# Patient Record
Sex: FEMALE | Employment: FULL TIME | URBAN - METROPOLITAN AREA
[De-identification: names, ages, dates, MRNs, and addresses within clinical notes are randomized per-mention and may not be internally consistent; named-entity substitution may affect disease eponyms.]

---

## 2017-01-26 ENCOUNTER — OFFICE VISIT (OUTPATIENT)
Dept: ORTHOPEDIC SURGERY | Age: 57
End: 2017-01-26

## 2017-01-26 VITALS
DIASTOLIC BLOOD PRESSURE: 64 MMHG | HEIGHT: 63 IN | TEMPERATURE: 98.2 F | OXYGEN SATURATION: 97 % | BODY MASS INDEX: 24.8 KG/M2 | WEIGHT: 140 LBS | HEART RATE: 62 BPM | RESPIRATION RATE: 18 BRPM | SYSTOLIC BLOOD PRESSURE: 117 MMHG

## 2017-01-26 DIAGNOSIS — M54.17 RADICULOPATHY, LUMBOSACRAL REGION: ICD-10-CM

## 2017-01-26 DIAGNOSIS — M47.816 SPONDYLOSIS WITHOUT MYELOPATHY OR RADICULOPATHY, LUMBAR REGION: Primary | ICD-10-CM

## 2017-01-26 RX ORDER — TIAGABINE HYDROCHLORIDE 4 MG/1
4 TABLET, FILM COATED ORAL
Qty: 270 TAB | Refills: 1 | Status: SHIPPED | OUTPATIENT
Start: 2017-01-26 | End: 2017-07-20 | Stop reason: SDUPTHER

## 2017-01-26 RX ORDER — ESOMEPRAZOLE MAGNESIUM 40 MG/1
CAPSULE, DELAYED RELEASE ORAL DAILY
COMMUNITY

## 2017-01-26 RX ORDER — LANOLIN ALCOHOL/MO/W.PET/CERES
325 CREAM (GRAM) TOPICAL
COMMUNITY

## 2017-01-26 RX ORDER — ALBUTEROL SULFATE 90 UG/1
2 AEROSOL, METERED RESPIRATORY (INHALATION)
COMMUNITY
Start: 2014-05-29

## 2017-01-26 NOTE — PROGRESS NOTES
[facility]  [address], [address]  Phone: [phone]  Fax: [phone]        PROGRESS NOTE      HISTORY OF PRESENT ILLNESS:  The patient is a 49 y.o. female and was seen today for follow up of low back pain extending into the bilateral lower extremities with left lower extremity symptoms greater than right. Pain extended in an SI distribution to the foot. In the past, it was noted she had minimal relief with lumbar blocks and physical therapy. She continues with Gabitril 4 mg TID and has decreased her Ibuprofen 800 mg intake to BID. She does HEP twice daily. She had negative lower extremity EMGs. Lumbar spine MRI per report revealed severe facet arthropathy at L5-S1 with bilateral facet joint effusions and moderate foraminal stenosis. There was no significant central canal stenosis. There was severe facet arthropathy at L5-S1 with slight anterolisthesis secondary to facet arthropathy. There was moderate foraminal stenosis at this level. At her last clinic appointment, patient wished to continue her current treatment. I refilled Gabitril and she decreased taking Motrin 800 mg to as needed. I recommended she continue the frequency of HEP daily. The patient returns today with pain location and distribution remain unchanged. She rates pain 3/10, a slight increase from her last visit (1/10). Patient admits completing her HEP every other day. She is compliant with Gabitril 4 mg TID. Patient reports she has not taken Motrin since 10/30/16.  reviewed. Past Medical History   Diagnosis Date    Depression     Facet arthropathy, lumbar (HCC)      L4-L5 and L3-L4    Hypercholesterolemia     Hypertension     Migraine headache     Vitamin D deficiency         Social History     Social History    Marital status: SINGLE     Spouse name: N/A    Number of children: N/A    Years of education: N/A     Occupational History    Not on file.      Social History Main Topics    Smoking status: Current Every Day Smoker     Packs/day: 0.25    Smokeless tobacco: Never Used    Alcohol use No    Drug use: Not on file    Sexual activity: Not on file      Comment: hysterectomy     Other Topics Concern    Not on file     Social History Narrative       Current Outpatient Prescriptions   Medication Sig Dispense Refill    esomeprazole (NEXIUM) 40 mg capsule Take  by mouth daily.  tiaGABine (GABITRIL) 4 mg tablet Take 1 Tab by mouth three (3) times daily (with meals). 270 Tab 1    tiaGABine (GABITRIL) 4 mg tablet Take 1 Tab by mouth three (3) times daily (with meals). 270 Tab 1    butalbital-acetaminophen-caffeine (FIORICET, ESGIC) -40 mg per tablet Take 1 Tab by Mouth Every 4 Hours As Needed.  atorvastatin (LIPITOR) 20 mg tablet Take  by mouth daily.  lisinopril-hydrochlorothiazide (PRINZIDE) 10-12.5 mg per tablet Take  by mouth daily.  ALPRAZolam (XANAX) 0.5 mg tablet Take 0.5 mg by mouth nightly as needed.  cholecalciferol, vitamin D3, (VITAMIN D3) 2,000 unit tab Take  by mouth daily.  escitalopram (LEXAPRO) 10 mg tablet Take 10 mg by mouth daily.  estradiol (ESTRACE) 1 mg tablet Take 1 mg by mouth daily.  ferrous sulfate 325 mg (65 mg iron) tablet 325 mg.      albuterol (PROVENTIL HFA, VENTOLIN HFA, PROAIR HFA) 90 mcg/actuation inhaler 2 Puffs.  tiaGABine (GABITRIL) 4 mg tablet Take 1 Tab by mouth three (3) times daily (with meals). 270 Tab 1    ibuprofen (MOTRIN) 800 mg tablet Take 1 Tab by mouth three (3) times daily (with meals). 270 Tab 0    tiaGABine (GABITRIL) 4 mg tablet 1 tab PO TID 90 Tab 1    ibuprofen (MOTRIN) 800 mg tablet Take 1 tablet by mouth three (3) times daily as needed for Pain. 90 tablet 1    lansoprazole (PREVACID) 30 mg capsule Take 30 mg by mouth Daily (before breakfast).          Allergies   Allergen Reactions    Voltaren [Diclofenac Sodium] Itching and Swelling          PHYSICAL EXAMINATION    Visit Vitals    /64 (BP 1 Location: Left arm, BP Patient Position: Sitting)    Pulse 62    Temp 98.2 °F (36.8 °C) (Oral)    Resp 18    Ht 5' 3\" (1.6 m)    Wt 140 lb (63.5 kg)    SpO2 97%    BMI 24.8 kg/m2       CONSTITUTIONAL: NAD, A&O x 3  SENSATION: Intact to light touch throughout  RANGE OF MOTION: The patient has full passive range of motion in all four extremities. MOTOR:  Straight Leg Raise: Negative, bilateral               Hip Flex Knee Ext Knee Flex Ankle DF GTE Ankle PF Tone   Right +4/5 +4/5 +4/5 +4/5 +4/5 +4/5 +4/5   Left +4/5 +4/5 +4/5 +4/5 +4/5 +4/5 +4/5       ASSESSMENT   Ankush Garcia was seen today for back pain and leg pain. Diagnoses and all orders for this visit:    Spondylosis without myelopathy or radiculopathy, lumbar region    Radiculopathy, lumbosacral region    Other orders  -     tiaGABine (GABITRIL) 4 mg tablet; Take 1 Tab by mouth three (3) times daily (with meals). IMPRESSION AND PLAN:  Patient has been holding steady. She would like to continue with her current regimen. I will refill her Gabitril 4 mg TID. Patient will attempt to decrease her Gabitril 4 mg to BID but has been instructed to increase back up to TID if pain elevates. I recommend she increase the frequency of HEP to daily. I will see the patient back in 6 month's time or earlier if needed. Written by Mira Arrieta, as dictated by Shamar Garnica MD  I examined the patient, reviewed and agree with the note.

## 2017-07-20 ENCOUNTER — OFFICE VISIT (OUTPATIENT)
Dept: ORTHOPEDIC SURGERY | Age: 57
End: 2017-07-20

## 2017-07-20 VITALS
HEART RATE: 62 BPM | SYSTOLIC BLOOD PRESSURE: 119 MMHG | WEIGHT: 137.8 LBS | RESPIRATION RATE: 18 BRPM | HEIGHT: 63 IN | DIASTOLIC BLOOD PRESSURE: 57 MMHG | BODY MASS INDEX: 24.41 KG/M2

## 2017-07-20 DIAGNOSIS — M54.17 RADICULOPATHY, LUMBOSACRAL REGION: ICD-10-CM

## 2017-07-20 DIAGNOSIS — M47.816 SPONDYLOSIS WITHOUT MYELOPATHY OR RADICULOPATHY, LUMBAR REGION: Primary | ICD-10-CM

## 2017-07-20 RX ORDER — OMEPRAZOLE 20 MG/1
20 TABLET, DELAYED RELEASE ORAL DAILY
COMMUNITY

## 2017-07-20 RX ORDER — COLESEVELAM 180 1/1
1875 TABLET ORAL 2 TIMES DAILY WITH MEALS
COMMUNITY

## 2017-07-20 RX ORDER — BUPROPION HYDROCHLORIDE 150 MG/1
150 TABLET, EXTENDED RELEASE ORAL
COMMUNITY
Start: 2017-07-13

## 2017-07-20 RX ORDER — BUTALBITAL, ASPIRIN, AND CAFFEINE 325; 50; 40 MG/1; MG/1; MG/1
CAPSULE ORAL
COMMUNITY
End: 2018-05-03 | Stop reason: SDUPTHER

## 2017-07-20 NOTE — MR AVS SNAPSHOT
Visit Information Date & Time Provider Department Dept. Phone Encounter #  
 7/20/2017  9:10 AM Rad Sanchez MD [facility] [phone] [id number] Follow-up Instructions Return in about 6 months (around 1/20/2018). Upcoming Health Maintenance Date Due Hepatitis C Screening 1960 Pneumococcal 19-64 Medium Risk (1 of 1 - PPSV23) 7/29/1981 DTaP/Tdap/Td series (1 - Tdap) 7/29/1983 PAP AKA CERVICAL CYTOLOGY 7/29/1983 BREAST CANCER SCRN MAMMOGRAM 7/29/2012 FOBT Q 1 YEAR AGE 50-75 7/29/2012 INFLUENZA AGE 9 TO ADULT 8/1/2017 Allergies as of 7/20/2017  Review Complete On: 7/20/2017 By: Rad Sanchez MD  
  
 Severity Noted Reaction Type Reactions Voltaren [Diclofenac Sodium]  04/29/2014    Itching, Swelling Current Immunizations  Never Reviewed No immunizations on file. Not reviewed this visit You Were Diagnosed With   
  
 Codes Comments Spondylosis without myelopathy or radiculopathy, lumbar region    -  Primary ICD-10-CM: M47.816 ICD-9-CM: 721.3 Radiculopathy, lumbosacral region     ICD-10-CM: M54.17 ICD-9-CM: 724.4 Vitals BP Pulse Resp Height(growth percentile) Weight(growth percentile) BMI  
 119/57 62 18 5' 3\" (1.6 m) 137 lb 12.8 oz (62.5 kg) 24.41 kg/m2 OB Status Smoking Status Hysterectomy Current Every Day Smoker BMI and BSA Data Body Mass Index Body Surface Area  
 24.41 kg/m 2 1.67 m 2 Preferred Pharmacy Pharmacy Name Phone 305 [facility], [address] [place] [address] [address] Your Updated Medication List  
  
   
This list is accurate as of: 7/20/17 10:30 AM.  Always use your most recent med list.  
  
  
  
  
 albuterol 90 mcg/actuation inhaler Commonly known as:  PROVENTIL HFA, VENTOLIN HFA, PROAIR HFA  
2 Puffs. buPROPion  mg SR tablet Commonly known as:  Adebayo Costello  
 150 mg.  
  
 butalbital-acetaminophen-caffeine -40 mg per tablet Commonly known as:  Yvonne Congress Take 1 Tab by Mouth Every 4 Hours As Needed. butalbital-aspirin-caffeine capsule Commonly known as:  Reino Josias Take 1 Tab by Mouth Every 4 Hours As Needed. estradiol 1 mg tablet Commonly known as:  ESTRACE Take 1 mg by mouth daily. ferrous sulfate 325 mg (65 mg iron) tablet 325 mg. NOT TAKING DUE TO CAUSING STOMACH ISSUES  
  
 ibuprofen 800 mg tablet Commonly known as:  MOTRIN Take 1 tablet by mouth three (3) times daily as needed for Pain. LEXAPRO 10 mg tablet Generic drug:  escitalopram oxalate Take 10 mg by mouth daily. LIPITOR 20 mg tablet Generic drug:  atorvastatin Take  by mouth daily. NexIUM 40 mg capsule Generic drug:  esomeprazole Take  by mouth daily. NOT TAKING DUE TO CURRENTLY TAKING PRILOSEC  
  
 PREVACID 30 mg capsule Generic drug:  lansoprazole Take 30 mg by mouth Daily (before breakfast). NOT TAKING PriLOSEC OTC 20 mg tablet Generic drug:  omeprazole Take 20 mg by mouth daily. PRINZIDE 10-12.5 mg per tablet Generic drug:  lisinopril-hydroCHLOROthiazide Take  by mouth daily. tiaGABine 4 mg tablet Commonly known as:  GABITRIL Take 1 Tab by mouth three (3) times daily (with meals). VITAMIN D3 2,000 unit Tab Generic drug:  cholecalciferol (vitamin D3) Take  by mouth daily. WELCHOL 625 mg tablet Generic drug:  colesevelam  
Take 1,875 mg by mouth two (2) times daily (with meals). XANAX 0.5 mg tablet Generic drug:  ALPRAZolam  
Take 0.5 mg by mouth nightly as needed. Follow-up Instructions Return in about 6 months (around 1/20/2018). Introducing [facility]! Abena Zavala introduces [employer] patient portal. Now you can access parts of your medical record, email your doctor's office, and request medication refills online. 1. In your internet browser, go to [email or web address] 2. Click on the First Time User? Click Here link in the Sign In box. You will see the New Member Sign Up page. 3. Enter your [employer] Access Code exactly as it appears below. You will not need to use this code after youve completed the sign-up process. If you do not sign up before the expiration date, you must request a new code. · [employer] Access Code: [email or web address] Expires: 10/18/2017  9:01 AM 
 
4. Enter the last four digits of your Social Security Number (xxxx) and Date of Birth (mm/dd/yyyy) as indicated and click Submit. You will be taken to the next sign-up page. 5. Create a Slinkyt ID. This will be your [employer] login ID and cannot be changed, so think of one that is secure and easy to remember. 6. Create a [employer] password. You can change your password at any time. 7. Enter your Password Reset Question and Answer. This can be used at a later time if you forget your password. 8. Enter your e-mail address. You will receive e-mail notification when new information is available in [address] 9. Click Sign Up. You can now view and download portions of your medical record. 10. Click the Download Summary menu link to download a portable copy of your medical information. If you have questions, please visit the Frequently Asked Questions section of the [employer] website. Remember, [employer] is NOT to be used for urgent needs. For medical emergencies, dial 911. Now available from your iPhone and Android! Please provide this summary of care documentation to your next provider. Your primary care clinician is listed as Asa Colon. If you have any questions after today's visit, please call [phone].

## 2017-07-20 NOTE — PROGRESS NOTES
[facility]  [address]  [address], [address]  Phone: [phone]  Fax: [phone]        PROGRESS NOTE      HISTORY OF PRESENT ILLNESS:  The patient is a 49 y.o. female and was seen today for follow up of low back pain extending into the BLE (L>R). Pain extended in an SI distribution to the foot. In the past, it was noted she had minimal relief with lumbar blocks and physical therapy. Pt has not recently taken Ibuprofen. She had negative lower extremity EMGs. Lumbar spine MRI from 9/6/13 per report revealed severe facet arthropathy at L5-S1 with bilateral facet joint effusions and moderate foraminal stenosis. There was no significant central canal stenosis. There was severe facet arthropathy at L5-S1 with slight anterolisthesis secondary to facet arthropathy. There was moderate foraminal stenosis at this level. At her last clinic appointment, patient had been holding steady. She wished to continue with her current regimen. I refilled her Gabitril 4 mg TID. Patient attempted to decrease her Gabitril 4 mg to BID but was instructed to increase back up to TID if pain elevated. I recommended she increase the frequency of HEP to daily. The patient returns today with pain location and distribution remain unchanged. She rates pain 0-2/10, an improvement since her last visit (3/10). Pt was able to reduce her Gabitril 4 mg to BID without an increase in pain.  reviewed. Past Medical History:   Diagnosis Date    Depression     Facet arthropathy, lumbar (HCC)     L4-L5 and L3-L4    Hypercholesterolemia     Hypertension     Migraine headache     Vitamin D deficiency         Social History     Social History    Marital status: SINGLE     Spouse name: N/A    Number of children: N/A    Years of education: N/A     Occupational History    Not on file.      Social History Main Topics    Smoking status: Current Every Day Smoker     Packs/day: 0.25    Smokeless tobacco: Never Used    Alcohol use No    Drug use: Not on file    Sexual activity: Not on file      Comment: hysterectomy     Other Topics Concern    Not on file     Social History Narrative       Current Outpatient Prescriptions   Medication Sig Dispense Refill    buPROPion SR (WELLBUTRIN SR) 150 mg SR tablet 150 mg.      omeprazole (PRILOSEC OTC) 20 mg tablet Take 20 mg by mouth daily.  colesevelam (WELCHOL) 625 mg tablet Take 1,875 mg by mouth two (2) times daily (with meals).  tiaGABine (GABITRIL) 4 mg tablet Take 1 Tab by mouth three (3) times daily (with meals). (Patient taking differently: Take 4 mg by mouth two (2) times daily (with meals). ) 270 Tab 1    butalbital-acetaminophen-caffeine (FIORICET, ESGIC) -40 mg per tablet Take 1 Tab by Mouth Every 4 Hours As Needed.  atorvastatin (LIPITOR) 20 mg tablet Take  by mouth daily.  lisinopril-hydrochlorothiazide (PRINZIDE) 10-12.5 mg per tablet Take  by mouth daily.  ALPRAZolam (XANAX) 0.5 mg tablet Take 0.5 mg by mouth nightly as needed.  cholecalciferol, vitamin D3, (VITAMIN D3) 2,000 unit tab Take  by mouth daily.  escitalopram (LEXAPRO) 10 mg tablet Take 10 mg by mouth daily.  butalbital-aspirin-caffeine (FIORINAL) capsule Take 1 Tab by Mouth Every 4 Hours As Needed.  ferrous sulfate 325 mg (65 mg iron) tablet 325 mg. NOT TAKING DUE TO CAUSING STOMACH ISSUES      albuterol (PROVENTIL HFA, VENTOLIN HFA, PROAIR HFA) 90 mcg/actuation inhaler 2 Puffs.  esomeprazole (NEXIUM) 40 mg capsule Take  by mouth daily. NOT TAKING DUE TO CURRENTLY TAKING PRILOSEC      ibuprofen (MOTRIN) 800 mg tablet Take 1 tablet by mouth three (3) times daily as needed for Pain. 90 tablet 1    lansoprazole (PREVACID) 30 mg capsule Take 30 mg by mouth Daily (before breakfast). NOT TAKING      estradiol (ESTRACE) 1 mg tablet Take 1 mg by mouth daily.          Allergies   Allergen Reactions    Voltaren [Diclofenac Sodium] Itching and Swelling          PHYSICAL EXAMINATION    Visit Vitals    /57    Pulse 62    Resp 18    Ht 5' 3\" (1.6 m)    Wt 137 lb 12.8 oz (62.5 kg)    BMI 24.41 kg/m2       CONSTITUTIONAL: NAD, A&O x 3  SENSATION: Intact to light touch throughout  RANGE OF MOTION: The patient has full passive range of motion in all four extremities. MOTOR:  Straight Leg Raise: Negative, bilateral               Hip Flex Knee Ext Knee Flex Ankle DF GTE Ankle PF Tone   Right +4/5 +4/5 +4/5 +4/5 +4/5 +4/5 +4/5   Left +4/5 +4/5 +4/5 +4/5 +4/5 +4/5 +4/5       ASSESSMENT   Sangeeta Glover was seen today for back pain and leg pain. Diagnoses and all orders for this visit:    Spondylosis without myelopathy or radiculopathy, lumbar region    Radiculopathy, lumbosacral region          IMPRESSION AND PLAN:  She continues to do well despite the reduction in her Gabitril dose. Patient has been instructed to decrease her Gabitril 4 mg to once daily if tolerated. I encourage patient to perform her HEP daily. I will see the patient back in 6 month's time or earlier if needed. Written by Angelina Arauz, as dictated by Donta Elder MD  I examined the patient, reviewed and agree with the note.

## 2018-02-22 ENCOUNTER — OFFICE VISIT (OUTPATIENT)
Dept: ORTHOPEDIC SURGERY | Age: 58
End: 2018-02-22

## 2018-02-22 VITALS
WEIGHT: 138 LBS | OXYGEN SATURATION: 95 % | DIASTOLIC BLOOD PRESSURE: 66 MMHG | TEMPERATURE: 98 F | BODY MASS INDEX: 24.45 KG/M2 | RESPIRATION RATE: 18 BRPM | HEART RATE: 62 BPM | HEIGHT: 63 IN | SYSTOLIC BLOOD PRESSURE: 118 MMHG

## 2018-02-22 DIAGNOSIS — M54.17 RADICULOPATHY, LUMBOSACRAL REGION: ICD-10-CM

## 2018-02-22 DIAGNOSIS — M47.816 SPONDYLOSIS WITHOUT MYELOPATHY OR RADICULOPATHY, LUMBAR REGION: Primary | ICD-10-CM

## 2018-02-22 RX ORDER — TOPIRAMATE 25 MG/1
TABLET ORAL
Qty: 90 TAB | Refills: 1 | Status: SHIPPED | OUTPATIENT
Start: 2018-02-22

## 2018-02-22 NOTE — MR AVS SNAPSHOT
[address] [place] [address] [address] [facility] [address] 
[phone] Patient: Karen Wei MRN: [id number] HBB:[id number] Visit Information Date & Time Provider Department Dept. Phone Encounter #  
 2/22/2018 10:40 AM Delores Benito MD [facility] [phone] [id number] Follow-up Instructions Return in about 4 weeks (around 3/22/2018). Follow-up and Disposition History Upcoming Health Maintenance Date Due Hepatitis C Screening 1960 Pneumococcal 19-64 Medium Risk (1 of 1 - PPSV23) 7/29/1981 DTaP/Tdap/Td series (1 - Tdap) 7/29/1983 PAP AKA CERVICAL CYTOLOGY 7/29/1983 BREAST CANCER SCRN MAMMOGRAM 7/29/2012 FOBT Q 1 YEAR AGE 50-75 7/29/2012 Influenza Age 5 to Adult 8/1/2017 Allergies as of 2/22/2018  Review Complete On: 2/22/2018 By: Delores Benito MD  
  
 Severity Noted Reaction Type Reactions Voltaren [Diclofenac Sodium]  04/29/2014    Itching, Swelling Current Immunizations  Never Reviewed No immunizations on file. Not reviewed this visit You Were Diagnosed With   
  
 Codes Comments Spondylosis without myelopathy or radiculopathy, lumbar region    -  Primary ICD-10-CM: M47.816 ICD-9-CM: 721.3 Radiculopathy, lumbosacral region     ICD-10-CM: M54.17 ICD-9-CM: 724.4 Vitals BP Pulse Temp Resp Height(growth percentile) Weight(growth percentile)  
 118/66 62 98 °F (36.7 °C) (Oral) 18 5' 3\" (1.6 m) 138 lb (62.6 kg) SpO2 BMI OB Status Smoking Status 95% 24.45 kg/m2 Hysterectomy Current Every Day Smoker Vitals History BMI and BSA Data Body Mass Index Body Surface Area  
 24.45 kg/m 2 1.67 m 2 Preferred Pharmacy Pharmacy Name Phone [facility], [address] [place] [address] [phone] Your Updated Medication List  
  
   
 This list is accurate as of 2/22/18 11:23 AM.  Always use your most recent med list.  
  
  
  
  
 albuterol 90 mcg/actuation inhaler Commonly known as:  PROVENTIL HFA, VENTOLIN HFA, PROAIR HFA  
2 Puffs. buPROPion  mg SR tablet Commonly known as:  WELLBUTRIN SR  
150 mg.  
  
 butalbital-acetaminophen-caffeine -40 mg per tablet Commonly known as:  Denver Sorrow Take 1 Tab by Mouth Every 4 Hours As Needed. butalbital-aspirin-caffeine capsule Commonly known as:  Ebb Savin Take 1 Tab by Mouth Every 4 Hours As Needed. estradiol 1 mg tablet Commonly known as:  ESTRACE Take 1 mg by mouth daily. ferrous sulfate 325 mg (65 mg iron) tablet 325 mg. NOT TAKING DUE TO CAUSING STOMACH ISSUES  
  
 ibuprofen 800 mg tablet Commonly known as:  MOTRIN Take 1 tablet by mouth three (3) times daily as needed for Pain. LEXAPRO 10 mg tablet Generic drug:  escitalopram oxalate Take 10 mg by mouth daily. LIPITOR 20 mg tablet Generic drug:  atorvastatin Take  by mouth daily. NexIUM 40 mg capsule Generic drug:  esomeprazole Take  by mouth daily. NOT TAKING DUE TO CURRENTLY TAKING PRILOSEC  
  
 PREVACID 30 mg capsule Generic drug:  lansoprazole Take 30 mg by mouth Daily (before breakfast). NOT TAKING PriLOSEC OTC 20 mg tablet Generic drug:  omeprazole Take 20 mg by mouth daily. PRINZIDE 10-12.5 mg per tablet Generic drug:  lisinopril-hydroCHLOROthiazide Take  by mouth daily. * tiaGABine 4 mg tablet Commonly known as:  GABITRIL Take 1 Tab by mouth three (3) times daily (with meals). * tiaGABine 4 mg tablet Commonly known as:  GABITRIL Take 1 Tab by mouth daily (with breakfast). topiramate 25 mg tablet Commonly known as:  TOPAMAX 3 tabs PO QHS  
  
 VITAMIN D3 2,000 unit Tab Generic drug:  cholecalciferol (vitamin D3) Take  by mouth daily. WELCHOL 625 mg tablet Generic drug:  colesevelam  
 Take 1,875 mg by mouth two (2) times daily (with meals). XANAX 0.5 mg tablet Generic drug:  ALPRAZolam  
Take 0.5 mg by mouth nightly as needed. * Notice: This list has 2 medication(s) that are the same as other medications prescribed for you. Read the directions carefully, and ask your doctor or other care provider to review them with you. Prescriptions Sent to Pharmacy Refills  
 topiramate (TOPAMAX) 25 mg tablet 1 Sig: 3 tabs PO QHS Class: Normal  
 Pharmacy: [facility], [address] [place] [address] #: [phone] Follow-up Instructions Return in about 4 weeks (around 3/22/2018). Introducing [facility]! Koko Shaw introduces [employer] patient portal. Now you can access parts of your medical record, email your doctor's office, and request medication refills online. 1. In your internet browser, go to [email or web address] 2. Click on the First Time User? Click Here link in the Sign In box. You will see the New Member Sign Up page. 3. Enter your [employer] Access Code exactly as it appears below. You will not need to use this code after youve completed the sign-up process. If you do not sign up before the expiration date, you must request a new code. · [employer] Access Code: [email or web address] Expires: 5/23/2018 11:23 AM 
 
4. Enter the last four digits of your Social Security Number (xxxx) and Date of Birth (mm/dd/yyyy) as indicated and click Submit. You will be taken to the next sign-up page. 5. Create a TearSolutionst ID. This will be your [employer] login ID and cannot be changed, so think of one that is secure and easy to remember. 6. Create a [employer] password. You can change your password at any time. 7. Enter your Password Reset Question and Answer. This can be used at a later time if you forget your password. 8. Enter your e-mail address.  You will receive e-mail notification when new information is available in [employer]. 9. Click Sign Up. You can now view and download portions of your medical record. 10. Click the Download Summary menu link to download a portable copy of your medical information. If you have questions, please visit the Frequently Asked Questions section of the [employer] website. Remember, [employer] is NOT to be used for urgent needs. For medical emergencies, dial 911. Now available from your iPhone and Android! Please provide this summary of care documentation to your next provider. Your primary care clinician is listed as Venkata Meza. If you have any questions after today's visit, please call [phone].

## 2018-02-22 NOTE — PROGRESS NOTES
[facility] SPECIALISTS  [address] [facility]  Katerina Mata, [address]   Phone: [phone]  Fax: [phone]        PROGRESS NOTE      HISTORY OF PRESENT ILLNESS:  The patient is a 56 y.o. female and was seen today for follow up of low back pain extending into the BLE (L>R). Pain extended in an S1 distribution to the foot. In the past, it was noted she had minimal relief with lumbar blocks and physical therapy. Pt has not recently taken Ibuprofen. She had negative lower extremity EMGs. Lumbar spine MRI from 9/6/13 per report revealed severe facet arthropathy at L5-S1 with bilateral facet joint effusions and moderate foraminal stenosis. There was no significant central canal stenosis. There was severe facet arthropathy at L5-S1 with slight anterolisthesis secondary to facet arthropathy. There was moderate foraminal stenosis at this level. At her last clinic appointment, she continued to do well despite the reduction in her Gabitril dose. Patient has been instructed to decrease her Gabitril 4 mg to once daily if tolerated. I encouraged patient to perform her HEP daily. The patient returns today with pain location and distribution remain unchanged. Her pain is aggravated with sitting. She rates pain 2/10, consistent with her last visit (0-2/10). Pt attempted to decreased her Gabitril successfully. She has completely discontinued the medication since 11/2017 without a major increase in pain. She does report there are times where her pain increases due to increased activity, however it is tolerable. Pt has a diagnosis of post laminectomy syndrome with a hx of cervical spinal surgery. Pt denies h/o lumbar spinal surgery. Patient has taken Neurontin 600 mg TID in the past which was prescribed by Dr. Karen Musa. She is unable to recalled if she tolerated Neurontin. Patient cannot recall if she has taken Lyrica. Pt is not a candidate for CYMBALTA secondary to taking Lexapro for depression.  Patient denies history of glaucoma.  reviewed. Body mass index is 24.45 kg/(m^2). Past Medical History:   Diagnosis Date    Depression     Facet arthropathy, lumbar     L4-L5 and L3-L4    Hypercholesterolemia     Hypertension     Migraine headache     Vitamin D deficiency         Social History     Social History    Marital status: SINGLE     Spouse name: N/A    Number of children: N/A    Years of education: N/A     Occupational History    Not on file. Social History Main Topics    Smoking status: Current Every Day Smoker     Packs/day: 0.25    Smokeless tobacco: Never Used    Alcohol use No    Drug use: Not on file    Sexual activity: Not on file      Comment: hysterectomy     Other Topics Concern    Not on file     Social History Narrative       Current Outpatient Prescriptions   Medication Sig Dispense Refill    topiramate (TOPAMAX) 25 mg tablet 3 tabs PO QHS 90 Tab 1    omeprazole (PRILOSEC OTC) 20 mg tablet Take 20 mg by mouth daily.  albuterol (PROVENTIL HFA, VENTOLIN HFA, PROAIR HFA) 90 mcg/actuation inhaler 2 Puffs.  butalbital-acetaminophen-caffeine (FIORICET, ESGIC) -40 mg per tablet Take 1 Tab by Mouth Every 4 Hours As Needed.  atorvastatin (LIPITOR) 20 mg tablet Take  by mouth daily.  lisinopril-hydrochlorothiazide (PRINZIDE) 10-12.5 mg per tablet Take  by mouth daily.  ALPRAZolam (XANAX) 0.5 mg tablet Take 0.5 mg by mouth nightly as needed.  cholecalciferol, vitamin D3, (VITAMIN D3) 2,000 unit tab Take  by mouth daily.  escitalopram (LEXAPRO) 10 mg tablet Take 10 mg by mouth daily.  tiaGABine (GABITRIL) 4 mg tablet Take 1 Tab by mouth daily (with breakfast). 90 Tab 1    butalbital-aspirin-caffeine (FIORINAL) capsule Take 1 Tab by Mouth Every 4 Hours As Needed.  buPROPion SR (WELLBUTRIN SR) 150 mg SR tablet 150 mg.      colesevelam (WELCHOL) 625 mg tablet Take 1,875 mg by mouth two (2) times daily (with meals).       ferrous sulfate 325 mg (65 mg iron) tablet 325 mg. NOT TAKING DUE TO CAUSING STOMACH ISSUES      esomeprazole (NEXIUM) 40 mg capsule Take  by mouth daily. NOT TAKING DUE TO CURRENTLY TAKING PRILOSEC      tiaGABine (GABITRIL) 4 mg tablet Take 1 Tab by mouth three (3) times daily (with meals). (Patient taking differently: Take 4 mg by mouth two (2) times daily (with meals). ) 270 Tab 1    ibuprofen (MOTRIN) 800 mg tablet Take 1 tablet by mouth three (3) times daily as needed for Pain. 90 tablet 1    lansoprazole (PREVACID) 30 mg capsule Take 30 mg by mouth Daily (before breakfast). NOT TAKING      estradiol (ESTRACE) 1 mg tablet Take 1 mg by mouth daily. Allergies   Allergen Reactions    Voltaren [Diclofenac Sodium] Itching and Swelling          PHYSICAL EXAMINATION    Visit Vitals    /66    Pulse 62    Temp 98 °F (36.7 °C) (Oral)    Resp 18    Ht 5' 3\" (1.6 m)    Wt 138 lb (62.6 kg)    SpO2 95%    BMI 24.45 kg/m2       CONSTITUTIONAL: NAD, A&O x 3  SENSATION: Intact to light touch throughout  RANGE OF MOTION: The patient has full passive range of motion in all four extremities. MOTOR:  Straight Leg Raise: Negative, bilateral               Hip Flex Knee Ext Knee Flex Ankle DF GTE Ankle PF Tone   Right +4/5 +4/5 +4/5 +4/5 +4/5 +4/5 +4/5   Left +4/5 +4/5 +4/5 +4/5 +4/5 +4/5 +4/5       ASSESSMENT   Diagnoses and all orders for this visit:    1. Spondylosis without myelopathy or radiculopathy, lumbar region    2. Radiculopathy, lumbosacral region    Other orders  -     topiramate (TOPAMAX) 25 mg tablet; 3 tabs PO QHS          IMPRESSION AND PLAN:  Patient reported tolerable pain after completely discontinuing her Gabitril. Patient is neurologically intact. I will try her on Topamax 75 mg qhs. The risks, benefits, and potential side effects of this medication were discussed. Patient understands and wishes to proceed. Patient advised to call the office if intolerant to new medication.  I will see the patient back in 1 month's time or earlier if needed. Written by Lima Cerna, as dictated by Marsha Membreno MD  I examined the patient, reviewed and agree with the note.

## 2018-03-29 ENCOUNTER — TELEPHONE (OUTPATIENT)
Dept: ORTHOPEDIC SURGERY | Age: 58
End: 2018-03-29

## 2018-03-29 ENCOUNTER — OFFICE VISIT (OUTPATIENT)
Dept: ORTHOPEDIC SURGERY | Age: 58
End: 2018-03-29

## 2018-03-29 VITALS
RESPIRATION RATE: 14 BRPM | BODY MASS INDEX: 24.41 KG/M2 | HEIGHT: 63 IN | DIASTOLIC BLOOD PRESSURE: 71 MMHG | WEIGHT: 137.8 LBS | HEART RATE: 62 BPM | SYSTOLIC BLOOD PRESSURE: 119 MMHG

## 2018-03-29 DIAGNOSIS — M47.816 SPONDYLOSIS WITHOUT MYELOPATHY OR RADICULOPATHY, LUMBAR REGION: ICD-10-CM

## 2018-03-29 DIAGNOSIS — M54.17 RADICULOPATHY, LUMBOSACRAL REGION: ICD-10-CM

## 2018-03-29 DIAGNOSIS — M43.10 ACQUIRED SPONDYLOLISTHESIS: Primary | ICD-10-CM

## 2018-03-29 DIAGNOSIS — M54.17 RADICULOPATHY, LUMBOSACRAL REGION: Primary | ICD-10-CM

## 2018-03-29 RX ORDER — ROSUVASTATIN CALCIUM 10 MG/1
10 TABLET, COATED ORAL
COMMUNITY
Start: 2018-03-28

## 2018-03-29 RX ORDER — DULOXETIN HYDROCHLORIDE 30 MG/1
30 CAPSULE, DELAYED RELEASE ORAL DAILY
Qty: 30 CAP | Refills: 1 | Status: SHIPPED | OUTPATIENT
Start: 2018-03-29 | End: 2018-08-02 | Stop reason: SDUPTHER

## 2018-03-29 NOTE — TELEPHONE ENCOUNTER
Called Dr. Hever Beltran office [phone] and left voice mail asking per the provider if it would be okay to change the patient from taking Lexapro 10 mg to Cymbalta 30 mg 1 po q daily. Received a call back from [facility] from Dr. Yue Carrera office and she states they received my message but she was on the telephone with a patient at the time. She states the doctor said it was okay for the patient to switch from Lexapro 10 mg to Cymbalta 30 mg once a day. I inquired if there is a weaning process and [facility] stated she would have to check with the doctor. She put me on hold and then came back to the telephone and stated that per the doctor there is no weaning instructions. The patient can just switch. I have spoke to Franky Phelps in regards to this message and he gave a verbal for the Cymbalta 30 mg #30 take 1 po q daily with 1 refill. Called patient and left voice mail informing patient that we have received the approval from her PCP to switch her from the Lexapro 10 mg to the Cymbalta 30 mg 1 po q daily. I have sent the medication to her local pharmacy and that she can just start taking the Cymbalta in place of the Lexapro but to discontinue taking the Lexapro once she starts the Cymbalta.

## 2018-03-29 NOTE — MR AVS SNAPSHOT
[address] [place] [address] [address] [facility] [address] 
[phone] Patient: Taran Flores MRN: [id number] PZT:[id number] Visit Information Date & Time Provider Department Dept. Phone Encounter #  
 3/29/2018  8:10 AM Kathy Quesada MD [address] [facility] [phone] [id number] Follow-up Instructions Return in about 4 weeks (around 4/26/2018). Upcoming Health Maintenance Date Due Hepatitis C Screening 1960 Pneumococcal 19-64 Medium Risk (1 of 1 - PPSV23) 7/29/1981 DTaP/Tdap/Td series (1 - Tdap) 7/29/1983 PAP AKA CERVICAL CYTOLOGY 7/29/1983 BREAST CANCER SCRN MAMMOGRAM 7/29/2012 FOBT Q 1 YEAR AGE 50-75 7/29/2012 Influenza Age 5 to Adult 8/1/2017 Allergies as of 3/29/2018  Review Complete On: 3/29/2018 By: Kathy Quesada MD  
  
 Severity Noted Reaction Type Reactions Varenicline  03/27/2018    Other (comments) Bad dreams Voltaren [Diclofenac Sodium]  04/29/2014    Itching, Swelling Current Immunizations  Never Reviewed No immunizations on file. Not reviewed this visit You Were Diagnosed With   
  
 Codes Comments Acquired spondylolisthesis    -  Primary ICD-10-CM: M43.10 ICD-9-CM: 738.4 Spondylosis without myelopathy or radiculopathy, lumbar region     ICD-10-CM: M47.816 ICD-9-CM: 721.3 Radiculopathy, lumbosacral region     ICD-10-CM: M54.17 ICD-9-CM: 724.4 Vitals BP Pulse Resp Height(growth percentile) Weight(growth percentile) BMI  
 119/71 62 14 5' 3\" (1.6 m) 137 lb 12.8 oz (62.5 kg) 24.41 kg/m2 OB Status Smoking Status Hysterectomy Current Every Day Smoker BMI and BSA Data Body Mass Index Body Surface Area  
 24.41 kg/m 2 1.67 m 2 Preferred Pharmacy Pharmacy Name Phone [address] [phone] Your Updated Medication List  
  
   
This list is accurate as of 3/29/18  8:48 AM.  Always use your most recent med list.  
  
  
  
  
 albuterol 90 mcg/actuation inhaler Commonly known as:  PROVENTIL HFA, VENTOLIN HFA, PROAIR HFA  
2 Puffs. buPROPion  mg SR tablet Commonly known as:  WELLBUTRIN SR  
150 mg.  
  
 butalbital-acetaminophen-caffeine -40 mg per tablet Commonly known as:  Lisset Karel Take 1 Tab by Mouth Every 4 Hours As Needed. butalbital-aspirin-caffeine capsule Commonly known as:  Georgetta Old Take 1 Tab by Mouth Every 4 Hours As Needed. estradiol 1 mg tablet Commonly known as:  ESTRACE Take 1 mg by mouth daily. ferrous sulfate 325 mg (65 mg iron) tablet 325 mg. NOT TAKING DUE TO CAUSING STOMACH ISSUES  
  
 ibuprofen 800 mg tablet Commonly known as:  MOTRIN Take 1 tablet by mouth three (3) times daily as needed for Pain. LEXAPRO 10 mg tablet Generic drug:  escitalopram oxalate Take 10 mg by mouth daily. LIPITOR 20 mg tablet Generic drug:  atorvastatin Take  by mouth daily. NexIUM 40 mg capsule Generic drug:  esomeprazole Take  by mouth daily. NOT TAKING DUE TO CURRENTLY TAKING PRILOSEC  
  
 PREVACID 30 mg capsule Generic drug:  lansoprazole Take 30 mg by mouth Daily (before breakfast). NOT TAKING PriLOSEC OTC 20 mg tablet Generic drug:  omeprazole Take 20 mg by mouth daily. PRINZIDE 10-12.5 mg per tablet Generic drug:  lisinopril-hydroCHLOROthiazide Take  by mouth daily. rosuvastatin 10 mg tablet Commonly known as:  CRESTOR 10 mg.  
  
 * tiaGABine 4 mg tablet Commonly known as:  GABITRIL Take 1 Tab by mouth three (3) times daily (with meals). * tiaGABine 4 mg tablet Commonly known as:  GABITRIL Take 1 Tab by mouth daily (with breakfast). topiramate 25 mg tablet Commonly known as:  TOPAMAX 3 tabs PO QHS  
  
 VITAMIN D3 2,000 unit Tab Generic drug:  cholecalciferol (vitamin D3) Take  by mouth daily. WELCHOL 625 mg tablet Generic drug:  colesevelam  
Take 1,875 mg by mouth two (2) times daily (with meals). XANAX 0.5 mg tablet Generic drug:  ALPRAZolam  
Take 0.5 mg by mouth nightly as needed. * Notice: This list has 2 medication(s) that are the same as other medications prescribed for you. Read the directions carefully, and ask your doctor or other care provider to review them with you. Follow-up Instructions Return in about 4 weeks (around 4/26/2018). Introducing [facility]! [address] [facility] introduces Leaky patient portal. Now you can access parts of your medical record, email your doctor's office, and request medication refills online. 1. In your internet browser, go to [email or web address] 2. Click on the First Time User? Click Here link in the Sign In box. You will see the New Member Sign Up page. 3. Enter your Leaky Access Code exactly as it appears below. You will not need to use this code after youve completed the sign-up process. If you do not sign up before the expiration date, you must request a new code. · Leaky Access Code: [email or web address] Expires: 5/23/2018 12:23 PM 
 
4. Enter the last four digits of your Social Security Number (xxxx) and Date of Birth (mm/dd/yyyy) as indicated and click Submit. You will be taken to the next sign-up page. 5. Create a [employer] ID. This will be your Leaky login ID and cannot be changed, so think of one that is secure and easy to remember. 6. Create a Leaky password. You can change your password at any time. 7. Enter your Password Reset Question and Answer. This can be used at a later time if you forget your password. 8. Enter your e-mail address. You will receive e-mail notification when new information is available in [address] 9. Click Sign Up. You can now view and download portions of your medical record. 10. Click the Download Summary menu link to download a portable copy of your medical information. If you have questions, please visit the Frequently Asked Questions section of the [employer] website. Remember, [employer] is NOT to be used for urgent needs. For medical emergencies, dial 911. Now available from your iPhone and Android! Please provide this summary of care documentation to your next provider. Your primary care clinician is listed as Brigette Nuno. If you have any questions after today's visit, please call [phone].

## 2018-03-29 NOTE — PROGRESS NOTES
[facility] SPECIAL[facility]  [address], [address]  Phone: [phone]  Fax: [phone]        PROGRESS NOTE      HISTORY OF PRESENT ILLNESS:  The patient is a 56 y.o. female and was seen today for follow up of low back pain extending into the BLE (L>R). BLE pain extended in an S1 distribution to the foot. Her pain is aggravated with sitting. In the past, it was noted she had minimal relief with lumbar blocks and physical therapy. Pt previously failed NEURONTIN 600 mg TID and GABITRIL 4 mg BID. Pt is not a candidate for Cymbalta as she is on other antidepressants. Pt denies history of glaucoma. Pt has a diagnosis of cervical postlaminectomy syndrome with a h/o cervical spinal surgery. Pt denies h/o lumbar spinal surgery. She had negative lower extremity EMGs. Lumbar spine MRI from 9/6/13 per report revealed severe facet arthropathy at L5-S1 with bilateral facet joint effusions and moderate foraminal stenosis. There was no significant central canal stenosis. There was severe facet arthropathy at L5-S1 with slight anterolisthesis secondary to facet arthropathybsr-v. There was moderate foraminal stenosis at this level. At her last clinic appointment, patient reported tolerable pain after completely discontinuing her Gabitril. Patient was neurologically intact. I tried her on Topamax 75 mg qhs. The patient returns today with pain location and distribution remain unchanged. She rates pain 4-5/10, elevated since her last visit (2/10). Pt reports increasing paraesthesias with TOPAMAX 75 mg qhs.  reviewed. Body mass index is 24.41 kg/(m^2).         Past Medical History:   Diagnosis Date    Depression     Facet arthropathy, lumbar     L4-L5 and L3-L4    Hypercholesterolemia     Hypertension     Migraine headache     Vitamin D deficiency         Social History     Social History    Marital status: SINGLE     Spouse name: N/A    Number of children: N/A    Years of education: N/A     Occupational History    Not on file. Social History Main Topics    Smoking status: Current Every Day Smoker     Packs/day: 0.25    Smokeless tobacco: Never Used    Alcohol use No    Drug use: Not on file    Sexual activity: Not on file      Comment: hysterectomy     Other Topics Concern    Not on file     Social History Narrative       Current Outpatient Prescriptions   Medication Sig Dispense Refill    topiramate (TOPAMAX) 25 mg tablet 3 tabs PO QHS 90 Tab 1    butalbital-aspirin-caffeine (FIORINAL) capsule Take 1 Tab by Mouth Every 4 Hours As Needed.  buPROPion SR (WELLBUTRIN SR) 150 mg SR tablet 150 mg.      omeprazole (PRILOSEC OTC) 20 mg tablet Take 20 mg by mouth daily.  colesevelam (WELCHOL) 625 mg tablet Take 1,875 mg by mouth two (2) times daily (with meals).  albuterol (PROVENTIL HFA, VENTOLIN HFA, PROAIR HFA) 90 mcg/actuation inhaler 2 Puffs.  butalbital-acetaminophen-caffeine (FIORICET, ESGIC) -40 mg per tablet Take 1 Tab by Mouth Every 4 Hours As Needed.  atorvastatin (LIPITOR) 20 mg tablet Take  by mouth daily.  ALPRAZolam (XANAX) 0.5 mg tablet Take 0.5 mg by mouth nightly as needed.  cholecalciferol, vitamin D3, (VITAMIN D3) 2,000 unit tab Take  by mouth daily.  escitalopram (LEXAPRO) 10 mg tablet Take 10 mg by mouth daily.  estradiol (ESTRACE) 1 mg tablet Take 1 mg by mouth daily.  rosuvastatin (CRESTOR) 10 mg tablet 10 mg.      tiaGABine (GABITRIL) 4 mg tablet Take 1 Tab by mouth daily (with breakfast). 90 Tab 1    ferrous sulfate 325 mg (65 mg iron) tablet 325 mg. NOT TAKING DUE TO CAUSING STOMACH ISSUES      esomeprazole (NEXIUM) 40 mg capsule Take  by mouth daily. NOT TAKING DUE TO CURRENTLY TAKING PRILOSEC      tiaGABine (GABITRIL) 4 mg tablet Take 1 Tab by mouth three (3) times daily (with meals). (Patient taking differently: Take 4 mg by mouth two (2) times daily (with meals). ) 270 Tab 1    ibuprofen (MOTRIN) 800 mg tablet Take 1 tablet by mouth three (3) times daily as needed for Pain. 90 tablet 1    lansoprazole (PREVACID) 30 mg capsule Take 30 mg by mouth Daily (before breakfast). NOT TAKING      lisinopril-hydrochlorothiazide (PRINZIDE) 10-12.5 mg per tablet Take  by mouth daily. Allergies   Allergen Reactions    Varenicline Other (comments)     Bad dreams    Voltaren [Diclofenac Sodium] Itching and Swelling          PHYSICAL EXAMINATION    Visit Vitals    /71    Pulse 62    Resp 14    Ht 5' 3\" (1.6 m)    Wt 137 lb 12.8 oz (62.5 kg)    BMI 24.41 kg/m2       CONSTITUTIONAL: NAD, A&O x 3  SENSATION: Intact to light touch throughout  RANGE OF MOTION: The patient has full passive range of motion in all four extremities. MOTOR:  Straight Leg Raise: Negative, bilateral               Hip Flex Knee Ext Knee Flex Ankle DF GTE Ankle PF Tone   Right +4/5 +4/5 +4/5 +4/5 +4/5 +4/5 +4/5   Left +4/5 +4/5 +4/5 +4/5 +4/5 +4/5 +4/5       ASSESSMENT   Diagnoses and all orders for this visit:    1. Acquired spondylolisthesis    2. Spondylosis without myelopathy or radiculopathy, lumbar region    3. Radiculopathy, lumbosacral region          IMPRESSION AND PLAN:  Patient is not interested in surgical intervention or lumbar blocks at this time. She will wean off Topamax 75 mg qhs. Will consult with Dr. Graciela Andino ([phone]) in regards to switching her Lexapro for Cymbalta 30 mg. I will see the patient back in 1 month's time or earlier if needed. Written by Ton Nance, as dictated by Thomas Carlson MD  I examined the patient, reviewed and agree with the note.

## 2018-03-30 NOTE — TELEPHONE ENCOUNTER
Called and informed patient that we had received an anpproval for her to switch from the Lexapro to Cymbalta and it has been sent to her local pharmacy and stop taking the Lexapro when she starts the Cymbalta. Patient verbalized understanding.

## 2018-05-03 ENCOUNTER — OFFICE VISIT (OUTPATIENT)
Dept: ORTHOPEDIC SURGERY | Age: 58
End: 2018-05-03

## 2018-05-03 VITALS
RESPIRATION RATE: 14 BRPM | DIASTOLIC BLOOD PRESSURE: 72 MMHG | HEART RATE: 59 BPM | OXYGEN SATURATION: 100 % | BODY MASS INDEX: 24.73 KG/M2 | HEIGHT: 63 IN | WEIGHT: 139.6 LBS | TEMPERATURE: 98.1 F | SYSTOLIC BLOOD PRESSURE: 115 MMHG

## 2018-05-03 DIAGNOSIS — M47.817 LUMBOSACRAL SPONDYLOSIS WITHOUT MYELOPATHY: ICD-10-CM

## 2018-05-03 DIAGNOSIS — M54.17 RADICULOPATHY, LUMBOSACRAL REGION: ICD-10-CM

## 2018-05-03 DIAGNOSIS — M43.10 ACQUIRED SPONDYLOLISTHESIS: Primary | ICD-10-CM

## 2018-05-03 PROBLEM — M47.816 SPONDYLOSIS WITHOUT MYELOPATHY OR RADICULOPATHY, LUMBAR REGION: Status: RESOLVED | Noted: 2017-01-26 | Resolved: 2018-05-03

## 2018-05-03 RX ORDER — DULOXETIN HYDROCHLORIDE 30 MG/1
30 CAPSULE, DELAYED RELEASE ORAL DAILY
Qty: 90 CAP | Refills: 0 | Status: SHIPPED | OUTPATIENT
Start: 2018-05-03

## 2018-05-03 NOTE — MR AVS SNAPSHOT
[address] [place] [address] [address] [facility] [address] 
[phone] Patient: Na Bailey MRN: [id number] VYF:3/53/1883 Visit Information Date & Time Provider Department Dept. Phone Encounter #  
 5/3/2018  9:20 AM Tawanda Short MD [facility] [phone] [id number] Follow-up Instructions Return in about 3 months (around 8/3/2018). Upcoming Health Maintenance Date Due Hepatitis C Screening 1960 Pneumococcal 19-64 Medium Risk (1 of 1 - PPSV23) 7/29/1981 DTaP/Tdap/Td series (1 - Tdap) 7/29/1983 PAP AKA CERVICAL CYTOLOGY 7/29/1983 BREAST CANCER SCRN MAMMOGRAM 7/29/2012 FOBT Q 1 YEAR AGE 50-75 7/29/2012 Influenza Age 5 to Adult 8/1/2018 Allergies as of 5/3/2018  Review Complete On: 5/3/2018 By: Lakeisha Bunn LPN Severity Noted Reaction Type Reactions Varenicline  03/27/2018    Other (comments) Bad dreams Voltaren [Diclofenac Sodium]  04/29/2014    Itching, Swelling Current Immunizations  Never Reviewed No immunizations on file. Not reviewed this visit You Were Diagnosed With   
  
 Codes Comments Acquired spondylolisthesis    -  Primary ICD-10-CM: M43.10 ICD-9-CM: 738.4 Lumbosacral spondylosis without myelopathy     ICD-10-CM: U59.914 ICD-9-CM: 721.3 Radiculopathy, lumbosacral region     ICD-10-CM: M54.17 ICD-9-CM: 724.4 Vitals BP Pulse Temp Resp Height(growth percentile) Weight(growth percentile) 115/72 (BP 1 Location: Left arm, BP Patient Position: Sitting) (!) 59 98.1 °F (36.7 °C) (Oral) 14 5' 3\" (1.6 m) 139 lb 9.6 oz (63.3 kg) SpO2 BMI OB Status Smoking Status 100% 24.73 kg/m2 Hysterectomy Current Every Day Smoker BMI and BSA Data Body Mass Index Body Surface Area 24.73 kg/m 2 1.68 m 2 Preferred Pharmacy Pharmacy Name Phone 305 [facility], [address] [place] [address] [address] Your Updated Medication List  
  
   
This list is accurate as of 5/3/18 10:46 AM.  Always use your most recent med list.  
  
  
  
  
 albuterol 90 mcg/actuation inhaler Commonly known as:  PROVENTIL HFA, VENTOLIN HFA, PROAIR HFA  
2 Puffs. buPROPion  mg SR tablet Commonly known as:  WELLBUTRIN SR  
150 mg.  
  
 butalbital-acetaminophen-caffeine -40 mg per tablet Commonly known as:  Lindalee Stack Take 1 Tab by Mouth Every 4 Hours As Needed. * DULoxetine 30 mg capsule Commonly known as:  CYMBALTA Take 1 Cap by mouth daily. Indications: NEUROPATHIC PAIN  
  
 * DULoxetine 30 mg capsule Commonly known as:  CYMBALTA Take 1 Cap by mouth daily. estradiol 1 mg tablet Commonly known as:  ESTRACE Take 1 mg by mouth daily. ferrous sulfate 325 mg (65 mg iron) tablet 325 mg. NOT TAKING DUE TO CAUSING STOMACH ISSUES  
  
 ibuprofen 800 mg tablet Commonly known as:  MOTRIN Take 1 tablet by mouth three (3) times daily as needed for Pain. LEXAPRO 10 mg tablet Generic drug:  escitalopram oxalate Take 10 mg by mouth daily. LIPITOR 20 mg tablet Generic drug:  atorvastatin Take  by mouth daily. NexIUM 40 mg capsule Generic drug:  esomeprazole Take  by mouth daily. NOT TAKING DUE TO CURRENTLY TAKING PRILOSEC  
  
 PREVACID 30 mg capsule Generic drug:  lansoprazole Take 30 mg by mouth Daily (before breakfast). NOT TAKING PriLOSEC OTC 20 mg tablet Generic drug:  omeprazole Take 20 mg by mouth daily. PRINZIDE 10-12.5 mg per tablet Generic drug:  lisinopril-hydroCHLOROthiazide Take  by mouth daily. rosuvastatin 10 mg tablet Commonly known as:  CRESTOR 10 mg.  
  
 tiaGABine 4 mg tablet Commonly known as:  GABITRIL Take 1 Tab by mouth daily (with breakfast). topiramate 25 mg tablet Commonly known as:  TOPAMAX 3 tabs PO QHS  
  
 VITAMIN D3 2,000 unit Tab Generic drug:  cholecalciferol (vitamin D3) Take  by mouth daily. WELCHOL 625 mg tablet Generic drug:  colesevelam  
Take 1,875 mg by mouth two (2) times daily (with meals). XANAX 0.5 mg tablet Generic drug:  ALPRAZolam  
Take 0.5 mg by mouth nightly as needed. * Notice: This list has 2 medication(s) that are the same as other medications prescribed for you. Read the directions carefully, and ask your doctor or other care provider to review them with you. Prescriptions Sent to Pharmacy Refills DULoxetine (CYMBALTA) 30 mg capsule 0 Sig: Take 1 Cap by mouth daily. Class: Normal  
 Pharmacy: [address]  #: [phone] Route: Oral  
  
Follow-up Instructions Return in about 3 months (around 8/3/2018). Introducing [facility] SERVICES! [facility] Am[facility] introduces Evolution Mobile Platform patient portal. Now you can access parts of your medical record, email your doctor's office, and request medication refills online. 1. In your internet browser, go to [email or web address] 2. Click on the First Time User? Click Here link in the Sign In box. You will see the New Member Sign Up page. 3. Enter your Evolution Mobile Platform Access Code exactly as it appears below. You will not need to use this code after youve completed the sign-up process. If you do not sign up before the expiration date, you must request a new code. · Evolution Mobile Platform Access Code: [email or web address] Expires: 5/23/2018 12:23 PM 
 
4. Enter the last four digits of your Social Security Number (xxxx) and Date of Birth (mm/dd/yyyy) as indicated and click Submit. You will be taken to the next sign-up page. 5. Create a Liquor.comt ID. This will be your Liquor.comt login ID and cannot be changed, so think of one that is secure and easy to remember. 6. Create a Liquor.comt password. You can change your password at any time. 7. Enter your Password Reset Question and Answer. This can be used at a later time if you forget your password. 8. Enter your e-mail address. You will receive e-mail notification when new information is available in [address] 9. Click Sign Up. You can now view and download portions of your medical record. 10. Click the Download Summary menu link to download a portable copy of your medical information. If you have questions, please visit the Frequently Asked Questions section of the ABA English website. Remember, ABA English is NOT to be used for urgent needs. For medical emergencies, dial 911. Now available from your iPhone and Android! Please provide this summary of care documentation to your next provider. Your primary care clinician is listed as Margarita Cramer. If you have any questions after today's visit, please call [phone].

## 2018-05-03 NOTE — PROGRESS NOTES
[facility]  [address], [place]   Phone: [phone]  Fax: [phone]        PROGRESS NOTE      HISTORY OF PRESENT ILLNESS:  The patient is a 56 y.o. female and was seen today for follow up of low back pain extending into the BLE (L>R). BLE pain extended in an S1 distribution to the foot. Her pain is aggravated with sitting. In the past, it was noted she had minimal relief with lumbar blocks and physical therapy. Pt previously failed NEURONTIN 600 mg TID and GABITRIL 4 mg BID. Pt reports increasing paraesthesias with TOPAMAX 75 mg qhs. Pt denies history of glaucoma. Pt has a diagnosis of cervical postlaminectomy syndrome with a h/o cervical spinal surgery. Pt denies h/o lumbar spinal surgery. She had negative lower extremity EMGs. Lumbar spine MRI from 9/6/13 per report revealed severe facet arthropathy at L5-S1 with bilateral facet joint effusions and moderate foraminal stenosis. There was no significant central canal stenosis. There was severe facet arthropathy at L5-S1 with slight anterolisthesis secondary to facet arthropathy There was moderate foraminal stenosis at this level. At her last clinic appointment, patient was not interested in surgical intervention or lumbar blocks at that time. She weaned off Topamax 75 mg qhs. Consulted with Dr. No Yuen ([phone]) in regards to switching her Lexapro for Cymbalta 30 mg. The patient returns today with pain location and distribution remain unchanged. She rates pain 1/10, an improvement since her last visit (4-5/10). Pt was able to d/c Lexapro 10 mg and start Cymbalta 30 mg. She is tolerating Cymbalta 30 mg with benefit for her low back pain and her depression.  reviewed. Body mass index is 24.73 kg/(m^2).       PCP: Biju Nunez MD      Past Medical History:   Diagnosis Date    Depression     Facet arthropathy, lumbar ([address]     L4-L5 and L3-L4    Hypercholesterolemia     Hypertension  Migraine headache     Vitamin D deficiency         Social History     Social History    Marital status: SINGLE     Spouse name: N/A    Number of children: N/A    Years of education: N/A     Occupational History    Not on file. Social History Main Topics    Smoking status: Current Every Day Smoker     Packs/day: 0.25    Smokeless tobacco: Never Used    Alcohol use No    Drug use: Not on file    Sexual activity: Not on file      Comment: hysterectomy     Other Topics Concern    Not on file     Social History Narrative       Current Outpatient Prescriptions   Medication Sig Dispense Refill    DULoxetine (CYMBALTA) 30 mg capsule Take 1 Cap by mouth daily. 90 Cap 0    rosuvastatin (CRESTOR) 10 mg tablet 10 mg.      DULoxetine (CYMBALTA) 30 mg capsule Take 1 Cap by mouth daily. Indications: NEUROPATHIC PAIN 30 Cap 1    buPROPion SR (WELLBUTRIN SR) 150 mg SR tablet 150 mg.      omeprazole (PRILOSEC OTC) 20 mg tablet Take 20 mg by mouth daily.  albuterol (PROVENTIL HFA, VENTOLIN HFA, PROAIR HFA) 90 mcg/actuation inhaler 2 Puffs.  butalbital-acetaminophen-caffeine (FIORICET, ESGIC) -40 mg per tablet Take 1 Tab by Mouth Every 4 Hours As Needed.  atorvastatin (LIPITOR) 20 mg tablet Take  by mouth daily.  lisinopril-hydrochlorothiazide (PRINZIDE) 10-12.5 mg per tablet Take  by mouth daily.  ALPRAZolam (XANAX) 0.5 mg tablet Take 0.5 mg by mouth nightly as needed.  cholecalciferol, vitamin D3, (VITAMIN D3) 2,000 unit tab Take  by mouth daily.  estradiol (ESTRACE) 1 mg tablet Take 1 mg by mouth daily.  topiramate (TOPAMAX) 25 mg tablet 3 tabs PO QHS (Patient not taking: Reported on 5/3/2018) 90 Tab 1    tiaGABine (GABITRIL) 4 mg tablet Take 1 Tab by mouth daily (with breakfast). 90 Tab 1    colesevelam (WELCHOL) 625 mg tablet Take 1,875 mg by mouth two (2) times daily (with meals).  ferrous sulfate 325 mg (65 mg iron) tablet 325 mg.  NOT TAKING DUE TO CAUSING STOMACH ISSUES      esomeprazole (NEXIUM) 40 mg capsule Take  by mouth daily. NOT TAKING DUE TO CURRENTLY TAKING PRILOSEC      ibuprofen (MOTRIN) 800 mg tablet Take 1 tablet by mouth three (3) times daily as needed for Pain. (Patient not taking: Reported on 5/3/2018) 90 tablet 1    lansoprazole (PREVACID) 30 mg capsule Take 30 mg by mouth Daily (before breakfast). NOT TAKING      escitalopram (LEXAPRO) 10 mg tablet Take 10 mg by mouth daily. Allergies   Allergen Reactions    Varenicline Other (comments)     Bad dreams    Voltaren [Diclofenac Sodium] Itching and Swelling          PHYSICAL EXAMINATION    Visit Vitals    /72 (BP 1 Location: Left arm, BP Patient Position: Sitting)    Pulse (!) 59    Temp 98.1 °F (36.7 °C) (Oral)    Resp 14    Ht 5' 3\" (1.6 m)    Wt 63.3 kg (139 lb 9.6 oz)    SpO2 100%    BMI 24.73 kg/m2       CONSTITUTIONAL: NAD, A&O x 3  SENSATION: Intact to light touch throughout  RANGE OF MOTION: The patient has full passive range of motion in all four extremities. MOTOR:  Straight Leg Raise: Negative, bilateral               Hip Flex Knee Ext Knee Flex Ankle DF GTE Ankle PF Tone   Right +4/5 +4/5 +4/5 +4/5 +4/5 +4/5 +4/5   Left +4/5 +4/5 +4/5 +4/5 +4/5 +4/5 +4/5       ASSESSMENT   Diagnoses and all orders for this visit:    1. Acquired spondylolisthesis    2. Lumbosacral spondylosis without myelopathy    3. Radiculopathy, lumbosacral region    Other orders  -     DULoxetine (CYMBALTA) 30 mg capsule; Take 1 Cap by mouth daily. IMPRESSION AND PLAN:  Patient wished to continue her current treatment. She was provided with refills of her Cymbalta 30 mg per day. I will see the patient back in 3 month's time or earlier if needed. Written by Erma Wolff, as dictated by Kaci Tyson MD  I examined the patient, reviewed and agree with the note.

## 2018-08-02 ENCOUNTER — OFFICE VISIT (OUTPATIENT)
Dept: ORTHOPEDIC SURGERY | Age: 58
End: 2018-08-02

## 2018-08-02 VITALS
HEART RATE: 68 BPM | HEIGHT: 63 IN | SYSTOLIC BLOOD PRESSURE: 120 MMHG | WEIGHT: 139 LBS | DIASTOLIC BLOOD PRESSURE: 74 MMHG | BODY MASS INDEX: 24.63 KG/M2

## 2018-08-02 DIAGNOSIS — M54.17 RADICULOPATHY, LUMBOSACRAL REGION: ICD-10-CM

## 2018-08-02 DIAGNOSIS — M47.817 LUMBOSACRAL SPONDYLOSIS WITHOUT MYELOPATHY: ICD-10-CM

## 2018-08-02 DIAGNOSIS — M43.10 ACQUIRED SPONDYLOLISTHESIS: Primary | ICD-10-CM

## 2018-08-02 RX ORDER — DULOXETIN HYDROCHLORIDE 30 MG/1
30 CAPSULE, DELAYED RELEASE ORAL DAILY
Qty: 90 CAP | Refills: 1 | Status: SHIPPED | OUTPATIENT
Start: 2018-08-02

## 2018-08-02 NOTE — PROGRESS NOTES
[facility]  [address], [address]  Phone: [phone]  Fax: [phone]        PROGRESS NOTE      HISTORY OF PRESENT ILLNESS:  The patient is a 66 y.o. female and was seen today for follow up of low back pain extending into the BLE (L>R). BLE pain extended in an S1 distribution to the foot. Her pain is aggravated with sitting. In the past, it was noted she had minimal relief with lumbar blocks and physical therapy. Pt previously failed NEURONTIN 600 mg TID and GABITRIL 4 mg BID. Pt reports increasing paraesthesias with TOPAMAX 75 mg qhs. Pt denies history of glaucoma. Pt has a diagnosis of cervical postlaminectomy syndrome with a h/o cervical spinal surgery. Pt denies h/o lumbar spinal surgery. She had negative lower extremity EMGs. Lumbar spine MRI from 9/6/13 per report revealed severe facet arthropathy at L5-S1 with bilateral facet joint effusions and moderate foraminal stenosis. There was no significant central canal stenosis. There was severe facet arthropathy at L5-S1 with slight anterolisthesis secondary to facet arthropathy There was moderate foraminal stenosis at this level. At her last clinical appointment, patient wished to continue her current treatment. She was provided with refills of her Cymbalta 30 mg per day. The patient returns today with pain localized to the low back. She denies radicular symptoms into the BLE at this time. She continues to rate her pain 0.5/10. She is compliant with Cymbalta 30 mg daily. Pt denies change in bowel or bladder habits.  reviewed. Body mass index is 24.62 kg/(m^2).       PCP: Eric Garcia MD      Past Medical History:   Diagnosis Date    Depression     Facet arthropathy, lumbar ([address]     L4-L5 and L3-L4    Hypercholesterolemia     Hypertension     Migraine headache     Vitamin D deficiency         Social History     Social History    Marital status: SINGLE     Spouse name: N/A    Number of children: N/A    Years of education: N/A     Occupational History    Not on file. Social History Main Topics    Smoking status: Current Every Day Smoker     Packs/day: 0.25    Smokeless tobacco: Never Used    Alcohol use No    Drug use: Not on file    Sexual activity: Not on file      Comment: hysterectomy     Other Topics Concern    Not on file     Social History Narrative       Current Outpatient Prescriptions   Medication Sig Dispense Refill    DULoxetine (CYMBALTA) 30 mg capsule Take 1 Cap by mouth daily. 90 Cap 1    DULoxetine (CYMBALTA) 30 mg capsule Take 1 Cap by mouth daily. 90 Cap 0    rosuvastatin (CRESTOR) 10 mg tablet 10 mg.      omeprazole (PRILOSEC OTC) 20 mg tablet Take 20 mg by mouth daily.  albuterol (PROVENTIL HFA, VENTOLIN HFA, PROAIR HFA) 90 mcg/actuation inhaler 2 Puffs.  butalbital-acetaminophen-caffeine (FIORICET, ESGIC) -40 mg per tablet Take 1 Tab by Mouth Every 4 Hours As Needed.  lisinopril-hydrochlorothiazide (PRINZIDE) 10-12.5 mg per tablet Take  by mouth daily.  ALPRAZolam (XANAX) 0.5 mg tablet Take 0.5 mg by mouth nightly as needed.  cholecalciferol, vitamin D3, (VITAMIN D3) 2,000 unit tab Take  by mouth daily.  topiramate (TOPAMAX) 25 mg tablet 3 tabs PO QHS (Patient not taking: Reported on 5/3/2018) 90 Tab 1    tiaGABine (GABITRIL) 4 mg tablet Take 1 Tab by mouth daily (with breakfast). (Patient not taking: Reported on 8/2/2018) 90 Tab 1    buPROPion SR (WELLBUTRIN SR) 150 mg SR tablet 150 mg.      colesevelam (WELCHOL) 625 mg tablet Take 1,875 mg by mouth two (2) times daily (with meals).  ferrous sulfate 325 mg (65 mg iron) tablet 325 mg. NOT TAKING DUE TO CAUSING STOMACH ISSUES      esomeprazole (NEXIUM) 40 mg capsule Take  by mouth daily. NOT TAKING DUE TO CURRENTLY TAKING PRILOSEC      ibuprofen (MOTRIN) 800 mg tablet Take 1 tablet by mouth three (3) times daily as needed for Pain.  (Patient not taking: Reported on 5/3/2018) 90 tablet 1    lansoprazole (PREVACID) 30 mg capsule Take 30 mg by mouth Daily (before breakfast). NOT TAKING      atorvastatin (LIPITOR) 20 mg tablet Take  by mouth daily.  escitalopram (LEXAPRO) 10 mg tablet Take 10 mg by mouth daily.  estradiol (ESTRACE) 1 mg tablet Take 1 mg by mouth daily. Allergies   Allergen Reactions    Varenicline Other (comments)     Bad dreams    Voltaren [Diclofenac Sodium] Itching and Swelling          PHYSICAL EXAMINATION    Visit Vitals    /74    Pulse 68    Ht 5' 3\" (1.6 m)    Wt 63 kg (139 lb)    BMI 24.62 kg/m2       CONSTITUTIONAL: NAD, A&O x 3  SENSATION: Intact to light touch throughout  RANGE OF MOTION: The patient has full passive range of motion in all four extremities. MOTOR:  Straight Leg Raise: Negative, bilateral               Hip Flex Knee Ext Knee Flex Ankle DF GTE Ankle PF Tone   Right +4/5 +4/5 +4/5 +4/5 +4/5 +4/5 +4/5   Left +4/5 +4/5 +4/5 +4/5 +4/5 +4/5 +4/5       ASSESSMENT   Diagnoses and all orders for this visit:    1. Acquired spondylolisthesis    2. Lumbosacral spondylosis without myelopathy    3. Radiculopathy, lumbosacral region    Other orders  -     DULoxetine (CYMBALTA) 30 mg capsule; Take 1 Cap by mouth daily. IMPRESSION AND PLAN:  Patient wished to continue her current treatment. I provided her with refills of her Cymbalta 30 mg daily. Patient is neurologically intact. I will see the patient back in 6 month's time or earlier if needed. Written by Eliberto Sicard, as dictated by Marina Irvin MD  I examined the patient, reviewed and agree with the note.

## 2018-08-02 NOTE — MR AVS SNAPSHOT
Carolyn Avalos [facility] [address] [facility] [address] 
[phone] Patient: Saima West MRN: [id number] CCI:[id number] Visit Information Date & Time Provider Department Dept. Phone Encounter #  
 8/2/2018  9:20 AM Katharine Cohen MD [facility] [phone] [id number] Follow-up Instructions Return in about 6 months (around 2/2/2019). Upcoming Health Maintenance Date Due Hepatitis C Screening 1960 Pneumococcal 19-64 Medium Risk (1 of 1 - PPSV23) 7/29/1981 DTaP/Tdap/Td series (1 - Tdap) 7/29/1983 PAP AKA CERVICAL CYTOLOGY 7/29/1983 BREAST CANCER SCRN MAMMOGRAM 7/29/2012 FOBT Q 1 YEAR AGE 50-75 7/29/2012 Influenza Age 5 to Adult 8/1/2018 Allergies as of 8/2/2018  Review Complete On: 8/2/2018 By: Katharine Cohen MD  
  
 Severity Noted Reaction Type Reactions Varenicline  03/27/2018    Other (comments) Bad dreams Voltaren [Diclofenac Sodium]  04/29/2014    Itching, Swelling Current Immunizations  Never Reviewed No immunizations on file. Not reviewed this visit You Were Diagnosed With   
  
 Codes Comments Acquired spondylolisthesis    -  Primary ICD-10-CM: M43.10 ICD-9-CM: 738.4 Lumbosacral spondylosis without myelopathy     ICD-10-CM: L79.611 ICD-9-CM: 721.3 Radiculopathy, lumbosacral region     ICD-10-CM: M54.17 ICD-9-CM: 724.4 Vitals BP Pulse Height(growth percentile) Weight(growth percentile) BMI OB Status 120/74 68 5' 3\" (1.6 m) 139 lb (63 kg) 24.62 kg/m2 Hysterectomy Smoking Status Current Every Day Smoker Vitals History BMI and BSA Data Body Mass Index Body Surface Area  
 24.62 kg/m 2 1.67 m 2 Preferred Pharmacy Pharmacy Name Phone 305 [facility], [address] [address] [place] [address] [address] Your Updated Medication List  
  
   
 This list is accurate as of 8/2/18 10:02 AM.  Always use your most recent med list.  
  
  
  
  
 albuterol 90 mcg/actuation inhaler Commonly known as:  PROVENTIL HFA, VENTOLIN HFA, PROAIR HFA  
2 Puffs. buPROPion  mg SR tablet Commonly known as:  WELLBUTRIN SR  
150 mg.  
  
 butalbital-acetaminophen-caffeine -40 mg per tablet Commonly known as:  Ligia Bigger Take 1 Tab by Mouth Every 4 Hours As Needed. * DULoxetine 30 mg capsule Commonly known as:  CYMBALTA Take 1 Cap by mouth daily. * DULoxetine 30 mg capsule Commonly known as:  CYMBALTA Take 1 Cap by mouth daily. estradiol 1 mg tablet Commonly known as:  ESTRACE Take 1 mg by mouth daily. ferrous sulfate 325 mg (65 mg iron) tablet 325 mg. NOT TAKING DUE TO CAUSING STOMACH ISSUES  
  
 ibuprofen 800 mg tablet Commonly known as:  MOTRIN Take 1 tablet by mouth three (3) times daily as needed for Pain. LEXAPRO 10 mg tablet Generic drug:  escitalopram oxalate Take 10 mg by mouth daily. LIPITOR 20 mg tablet Generic drug:  atorvastatin Take  by mouth daily. NexIUM 40 mg capsule Generic drug:  esomeprazole Take  by mouth daily. NOT TAKING DUE TO CURRENTLY TAKING PRILOSEC  
  
 PREVACID 30 mg capsule Generic drug:  lansoprazole Take 30 mg by mouth Daily (before breakfast). NOT TAKING PriLOSEC OTC 20 mg tablet Generic drug:  omeprazole Take 20 mg by mouth daily. PRINZIDE 10-12.5 mg per tablet Generic drug:  lisinopril-hydroCHLOROthiazide Take  by mouth daily. rosuvastatin 10 mg tablet Commonly known as:  CRESTOR 10 mg.  
  
 tiaGABine 4 mg tablet Commonly known as:  GABITRIL Take 1 Tab by mouth daily (with breakfast). topiramate 25 mg tablet Commonly known as:  TOPAMAX 3 tabs PO QHS  
  
 VITAMIN D3 2,000 unit Tab Generic drug:  cholecalciferol (vitamin D3) Take  by mouth daily. WELCHOL 625 mg tablet Generic drug:  colesevelam  
Take 1,875 mg by mouth two (2) times daily (with meals). XANAX 0.5 mg tablet Generic drug:  ALPRAZolam  
Take 0.5 mg by mouth nightly as needed. * Notice: This list has 2 medication(s) that are the same as other medications prescribed for you. Read the directions carefully, and ask your doctor or other care provider to review them with you. Prescriptions Sent to Pharmacy Refills DULoxetine (CYMBALTA) 30 mg capsule 1 Sig: Take 1 Cap by mouth daily. Class: Normal  
 Pharmacy: [address], [address]  #: [phone] Route: Oral  
  
Follow-up Instructions Return in about 6 months (around 2/2/2019). Introducing [facility]! [facility] introduces WalkHub patient portal. Now you can access parts of your medical record, email your doctor's office, and request medication refills online. 1. In your internet browser, go to [email or web address] 2. Click on the First Time User? Click Here link in the Sign In box. You will see the New Member Sign Up page. 3. Enter your WalkHub Access Code exactly as it appears below. You will not need to use this code after youve completed the sign-up process. If you do not sign up before the expiration date, you must request a new code. · WalkHub Access Code: [email or web address] Expires: 10/31/2018 10:02 AM 
 
4. Enter the last four digits of your Social Security Number (xxxx) and Date of Birth (mm/dd/yyyy) as indicated and click Submit. You will be taken to the next sign-up page. 5. Create a Forsaket ID. This will be your WalkHub login ID and cannot be changed, so think of one that is secure and easy to remember. 6. Create a WalkHub password. You can change your password at any time. 7. Enter your Password Reset Question and Answer. This can be used at a later time if you forget your password. 8. Enter your e-mail address. You will receive e-mail notification when new information is available in [address] 9. Click Sign Up. You can now view and download portions of your medical record. 10. Click the Download Summary menu link to download a portable copy of your medical information. If you have questions, please visit the Frequently Asked Questions section of the SurgiQuest website. Remember, SurgiQuest is NOT to be used for urgent needs. For medical emergencies, dial 911. Now available from your iPhone and Android! Please provide this summary of care documentation to your next provider. Your primary care clinician is listed as Ltanya Hash. If you have any questions after today's visit, please call [phone].

## 2019-01-31 ENCOUNTER — OFFICE VISIT (OUTPATIENT)
Dept: ORTHOPEDIC SURGERY | Age: 59
End: 2019-01-31

## 2019-01-31 VITALS
BODY MASS INDEX: 24.98 KG/M2 | HEART RATE: 69 BPM | RESPIRATION RATE: 17 BRPM | DIASTOLIC BLOOD PRESSURE: 84 MMHG | WEIGHT: 141 LBS | TEMPERATURE: 98.1 F | SYSTOLIC BLOOD PRESSURE: 132 MMHG | OXYGEN SATURATION: 100 %

## 2019-01-31 DIAGNOSIS — M43.10 ACQUIRED SPONDYLOLISTHESIS: Primary | ICD-10-CM

## 2019-01-31 DIAGNOSIS — M47.817 LUMBOSACRAL SPONDYLOSIS WITHOUT MYELOPATHY: ICD-10-CM

## 2019-01-31 DIAGNOSIS — M54.17 RADICULOPATHY, LUMBOSACRAL REGION: ICD-10-CM

## 2019-01-31 RX ORDER — DULOXETIN HYDROCHLORIDE 60 MG/1
60 CAPSULE, DELAYED RELEASE ORAL DAILY
Qty: 30 CAP | Refills: 1 | Status: SHIPPED | OUTPATIENT
Start: 2019-01-31 | End: 2019-01-31

## 2019-01-31 RX ORDER — DULOXETIN HYDROCHLORIDE 60 MG/1
60 CAPSULE, DELAYED RELEASE ORAL DAILY
Qty: 30 CAP | Refills: 1 | Status: SHIPPED | OUTPATIENT
Start: 2019-01-31

## 2019-01-31 NOTE — PROGRESS NOTES
[facility]  [address], [place]   Phone: [phone]  Fax: [phone]        PROGRESS NOTE      HISTORY OF PRESENT ILLNESS:  The patient is a 66 y.o. female and was seen today for follow up of pain localized to the low back. She denies radicular symptoms into the BLE at this time. She was initially seen with low back pain extending into the BLE (L>R). BLE pain extended in an S1 distribution to the foot. Her pain is aggravated with sitting. In the past, it was noted she had minimal relief with lumbar blocks and physical therapy. Pt previously failed NEURONTIN 600 mg TID and GABITRIL 4 mg BID. Pt reports increasing paraesthesias with TOPAMAX 75 mg qhs. Pt denies history of glaucoma. Pt has a diagnosis of cervical postlaminectomy syndrome with a h/o cervical spinal surgery. Pt denies h/o lumbar spinal surgery. She had negative lower extremity EMGs. Lumbar spine MRI from 9/6/13 per report revealed severe facet arthropathy at L5-S1 with bilateral facet joint effusions and moderate foraminal stenosis. There was no significant central canal stenosis. There was severe facet arthropathy at L5-S1 with slight anterolisthesis secondary to facet arthropathy There was moderate foraminal stenosis at this level. At her last clinical appointment, patient wished to continue her current treatment. I provided her with refills of her Cymbalta 30 mg daily. The patient returns today with pain location and distribution remain unchanged. She rates her pain 3/10, a slight increase from her last visit (0.5/10). Pt reports she has a bone spur on her left foot (she is followed by Dr. Etelvina Buck for this). She believes this bone spur has thrown off her gait and is responsible for her increase in pain. She is compliant with Cymbalta 30 mg daily. She is completing her HEP daily. Pt denies change in bowel or bladder habits.  reviewed. Body mass index is 24.98 kg/m².       PCP: Tu Dash MD      Past Medical History:   Diagnosis Date    Depression     Facet arthropathy, lumbar (HCC)     L4-L5 and L3-L4    Hypercholesterolemia     Hypertension     Migraine headache     Vitamin D deficiency         Social History     Socioeconomic History    Marital status: SINGLE     Spouse name: Not on file    Number of children: Not on file    Years of education: Not on file    Highest education level: Not on file   Social Needs    Financial resource strain: Not on file    Food insecurity - worry: Not on file    Food insecurity - inability: Not on file    Transportation needs - medical: Not on file   [employer] needs - non-medical: Not on file   Occupational History    Not on file   Tobacco Use    Smoking status: Current Every Day Smoker     Packs/day: 0.25    Smokeless tobacco: Never Used   Substance and Sexual Activity    Alcohol use: No    Drug use: Not on file    Sexual activity: Not on file     Comment: hysterectomy   Other Topics Concern    Not on file   Social History Narrative    Not on file       Current Outpatient Medications   Medication Sig Dispense Refill    DULoxetine (CYMBALTA) 30 mg capsule Take 1 Cap by mouth daily. 90 Cap 1    DULoxetine (CYMBALTA) 30 mg capsule Take 1 Cap by mouth daily. 90 Cap 0    rosuvastatin (CRESTOR) 10 mg tablet 10 mg.      topiramate (TOPAMAX) 25 mg tablet 3 tabs PO QHS (Patient not taking: Reported on 5/3/2018) 90 Tab 1    tiaGABine (GABITRIL) 4 mg tablet Take 1 Tab by mouth daily (with breakfast). (Patient not taking: Reported on 8/2/2018) 90 Tab 1    buPROPion SR (WELLBUTRIN SR) 150 mg SR tablet 150 mg.      omeprazole (PRILOSEC OTC) 20 mg tablet Take 20 mg by mouth daily.  colesevelam (WELCHOL) 625 mg tablet Take 1,875 mg by mouth two (2) times daily (with meals).  ferrous sulfate 325 mg (65 mg iron) tablet 325 mg.  NOT TAKING DUE TO CAUSING STOMACH ISSUES      albuterol (PROVENTIL HFA, VENTOLIN HFA, PROAIR HFA) 90 mcg/actuation inhaler 2 Puffs.  esomeprazole (NEXIUM) 40 mg capsule Take  by mouth daily. NOT TAKING DUE TO CURRENTLY TAKING PRILOSEC      ibuprofen (MOTRIN) 800 mg tablet Take 1 tablet by mouth three (3) times daily as needed for Pain. (Patient not taking: Reported on 5/3/2018) 90 tablet 1    lansoprazole (PREVACID) 30 mg capsule Take 30 mg by mouth Daily (before breakfast). NOT TAKING      butalbital-acetaminophen-caffeine (FIORICET, ESGIC) -40 mg per tablet Take 1 Tab by Mouth Every 4 Hours As Needed.  atorvastatin (LIPITOR) 20 mg tablet Take  by mouth daily.  lisinopril-hydrochlorothiazide (PRINZIDE) 10-12.5 mg per tablet Take  by mouth daily.  ALPRAZolam (XANAX) 0.5 mg tablet Take 0.5 mg by mouth nightly as needed.  cholecalciferol, vitamin D3, (VITAMIN D3) 2,000 unit tab Take  by mouth daily.  escitalopram (LEXAPRO) 10 mg tablet Take 10 mg by mouth daily.  estradiol (ESTRACE) 1 mg tablet Take 1 mg by mouth daily. Allergies   Allergen Reactions    Varenicline Other (comments)     Bad dreams    Voltaren [Diclofenac Sodium] Itching and Swelling          PHYSICAL EXAMINATION    Visit Vitals  /84 (BP 1 Location: Left arm, BP Patient Position: Sitting)   Pulse 69   Temp 98.1 °F (36.7 °C) (Oral)   Resp 17   Wt 141 lb (64 kg)   SpO2 100%   BMI 24.98 kg/m²       CONSTITUTIONAL: NAD, A&O x 3  SENSATION: Intact to light touch throughout  RANGE OF MOTION: The patient has full passive range of motion in all four extremities. MOTOR:  Straight Leg Raise: Negative, bilateral               Hip Flex Knee Ext Knee Flex Ankle DF GTE Ankle PF Tone   Right +4/5 +4/5 +4/5 +4/5 +4/5 +4/5 +4/5   Left +4/5 +4/5 +4/5 +4/5 +4/5 +4/5 +4/5       ASSESSMENT   Diagnoses and all orders for this visit:    1. Acquired spondylolisthesis    2. Lumbosacral spondylosis without myelopathy    3.  Radiculopathy, lumbosacral region    Other orders  -     DULoxetine (CYMBALTA) 60 mg capsule; Take 1 Cap by mouth daily. IMPRESSION AND PLAN:  I will increase her dose of Cymbalta to 60 mg daily. Patient advised to call the office if intolerant to higher dose. I recommend she continue to complete her HEP daily. Patient is not interested in surgical intervention or lumbar blocks at this time. Patient is neurologically intact. I will see the patient back in 1 month's time or earlier if needed. Written by Nevin Berger, as dictated by Lucia Atkinson MD  I examined the patient, reviewed and agree with the note.